# Patient Record
Sex: MALE | Race: OTHER | Employment: FULL TIME | URBAN - METROPOLITAN AREA
[De-identification: names, ages, dates, MRNs, and addresses within clinical notes are randomized per-mention and may not be internally consistent; named-entity substitution may affect disease eponyms.]

---

## 2021-05-13 ENCOUNTER — APPOINTMENT (OUTPATIENT)
Dept: GENERAL RADIOLOGY | Age: 36
End: 2021-05-13

## 2021-05-13 ENCOUNTER — HOSPITAL ENCOUNTER (EMERGENCY)
Age: 36
Discharge: HOME | End: 2021-05-13

## 2021-05-13 VITALS
HEIGHT: 69 IN | BODY MASS INDEX: 38.51 KG/M2 | DIASTOLIC BLOOD PRESSURE: 80 MMHG | HEART RATE: 66 BPM | RESPIRATION RATE: 18 BRPM | TEMPERATURE: 98.3 F | WEIGHT: 260 LBS | OXYGEN SATURATION: 98 % | SYSTOLIC BLOOD PRESSURE: 138 MMHG

## 2021-05-13 DIAGNOSIS — M25.562 ACUTE PAIN OF LEFT KNEE: Primary | ICD-10-CM

## 2021-05-13 PROCEDURE — 99283 EMERGENCY DEPT VISIT LOW MDM: CPT

## 2021-05-13 PROCEDURE — 73562 X-RAY EXAM OF KNEE 3: CPT

## 2021-05-13 PROCEDURE — 6370000000 HC RX 637 (ALT 250 FOR IP): Performed by: PHYSICIAN ASSISTANT

## 2021-05-13 RX ORDER — HYDROCODONE BITARTRATE AND ACETAMINOPHEN 5; 325 MG/1; MG/1
1 TABLET ORAL ONCE
Status: COMPLETED | OUTPATIENT
Start: 2021-05-13 | End: 2021-05-13

## 2021-05-13 RX ORDER — NAPROXEN 500 MG/1
500 TABLET ORAL 2 TIMES DAILY WITH MEALS
Qty: 30 TABLET | Refills: 0 | Status: SHIPPED | OUTPATIENT
Start: 2021-05-13 | End: 2021-08-06 | Stop reason: ALTCHOICE

## 2021-05-13 RX ADMIN — HYDROCODONE BITARTRATE AND ACETAMINOPHEN 1 TABLET: 5; 325 TABLET ORAL at 20:29

## 2021-05-13 ASSESSMENT — PAIN DESCRIPTION - ORIENTATION: ORIENTATION: LEFT

## 2021-05-13 ASSESSMENT — ENCOUNTER SYMPTOMS
NAUSEA: 0
COLOR CHANGE: 0
BACK PAIN: 0

## 2021-05-13 ASSESSMENT — PAIN SCALES - GENERAL: PAINLEVEL_OUTOF10: 10

## 2021-05-13 NOTE — ED NOTES
Bed: A12-12  Expected date:   Expected time:   Means of arrival:   Comments:  8000 Saima Wilkerson, RN  05/13/21 1944

## 2021-05-14 NOTE — ED PROVIDER NOTES
%  Physical Exam  Vitals signs and nursing note reviewed. Constitutional:       General: He is not in acute distress. Appearance: He is well-developed. He is not diaphoretic. HENT:      Head: Normocephalic and atraumatic. Eyes:      Conjunctiva/sclera: Conjunctivae normal.   Neck:      Musculoskeletal: Normal range of motion. Cardiovascular:      Rate and Rhythm: Normal rate and regular rhythm. Heart sounds: Normal heart sounds. Pulmonary:      Effort: Pulmonary effort is normal. No respiratory distress. Breath sounds: Normal breath sounds. Abdominal:      Palpations: Abdomen is soft. Tenderness: There is no abdominal tenderness. Musculoskeletal:      Left knee: He exhibits decreased range of motion, swelling, effusion ( medial) and bony tenderness. He exhibits normal patellar mobility. Tenderness found. Medial joint line tenderness noted. Left ankle: He exhibits normal pulse. No tenderness. Skin:     General: Skin is warm and dry. Neurological:      Mental Status: He is alert and oriented to person, place, and time. Psychiatric:         Behavior: Behavior normal.         Thought Content: Thought content normal.         Judgment: Judgment normal.         Diagnostic Results       RADIOLOGY:  XR KNEE LEFT (3 VIEWS)   Final Result   Impression:    No acute osseous injury. LABS:   No results found for this visit on 05/13/21. ED BEDSIDE ULTRASOUND:      RECENT VITALS:  BP: 138/80, Temp: 98.3 °F (36.8 °C), Pulse: 66, Resp: 18, SpO2: 98 %     Procedures         ED Course     Nursing Notes, Past Medical Hx,Past Surgical Hx, Social Hx, Allergies, and Family Hx were reviewed.     The patient was given the following medications:  Orders Placed This Encounter   Medications    HYDROcodone-acetaminophen (NORCO) 5-325 MG per tablet 1 tablet    naproxen (NAPROSYN) 500 MG tablet     Sig: Take 1 tablet by mouth 2 times daily (with meals)     Dispense:  30 tablet     Refill: 0       CONSULTS:  None    MEDICAL DECISION MAKING / ASSESSMENT / Joselin Yovanny is a 43 y.o. male [facility] emergency department complaint of left knee pain. States this has been going on for the last couple of months but today became so severe he has difficulty moving his knee. Patient had limited range of motion and had to very slowly extend at the knee and was unable to hold leg off bed. After pain medication patient is able to straighten to just under 180 and I am able to feel contraction of the quadriceps as well as palpate his patellar tendon so I do not feel this is a tendon rupture. There is no erythema or warmth concerning for septic joint or overlying cellulitis. He is neurovascular intact distally. X-ray was obtained and no acute osseous abnormality with diffuse soft tissue swelling. Patient was offered a knee immobilizer but he states that it is painful to have his knee fully extended. He already has crutches. An Ace wrap was placed on his knee. He will was given contact information for orthopedics and will call tomorrow to schedule follow-up appointment. I do not have concern for compartment syndrome, DVT, or infectious process in the lower leg. Pain is localized to his anterior knee. Differentials could be ACL or meniscus injury. There is no specific laxity, however difficult to evaluate due to swelling. Patient is in agreement with the plan. He will rest ice elevate and follow-up as discussed with orthopedics. He should take anti-inflammatories as needed for pain. He is stable for discharge. This patient was also evaluated by the attending physician. All care plans were discussed and agreed upon. Clinical Impression     1.  Acute pain of left knee        Disposition     PATIENT REFERRED TO:  DO Latisha Fabianrichard [address] [place] [address] Clement Prasad DO  [address] [facility] [address]  [place] [address] [place] [id number]  [phone]    Call in 1 day        DISCHARGE MEDICATIONS:  New Prescriptions    NAPROXEN (NAPROSYN) 500 MG TABLET    Take 1 tablet by mouth 2 times daily (with meals)       [address] [place], [place]  05/13/21 3573

## 2021-06-02 ENCOUNTER — OFFICE VISIT (OUTPATIENT)
Dept: FAMILY MEDICINE CLINIC | Age: 36
End: 2021-06-02

## 2021-06-02 VITALS
WEIGHT: 280.2 LBS | SYSTOLIC BLOOD PRESSURE: 136 MMHG | TEMPERATURE: 97.5 F | OXYGEN SATURATION: 97 % | HEIGHT: 69 IN | BODY MASS INDEX: 41.5 KG/M2 | RESPIRATION RATE: 16 BRPM | HEART RATE: 87 BPM | DIASTOLIC BLOOD PRESSURE: 87 MMHG

## 2021-06-02 DIAGNOSIS — S83.242A ACUTE MEDIAL MENISCUS TEAR OF LEFT KNEE, INITIAL ENCOUNTER: Primary | ICD-10-CM

## 2021-06-02 PROCEDURE — 99212 OFFICE O/P EST SF 10 MIN: CPT | Performed by: FAMILY MEDICINE

## 2021-06-02 SDOH — ECONOMIC STABILITY: FOOD INSECURITY: WITHIN THE PAST 12 MONTHS, YOU WORRIED THAT YOUR FOOD WOULD RUN OUT BEFORE YOU GOT MONEY TO BUY MORE.: NEVER TRUE

## 2021-06-02 SDOH — ECONOMIC STABILITY: TRANSPORTATION INSECURITY
IN THE PAST 12 MONTHS, HAS LACK OF TRANSPORTATION KEPT YOU FROM MEETINGS, WORK, OR FROM GETTING THINGS NEEDED FOR DAILY LIVING?: NO

## 2021-06-02 SDOH — ECONOMIC STABILITY: FOOD INSECURITY: WITHIN THE PAST 12 MONTHS, THE FOOD YOU BOUGHT JUST DIDN'T LAST AND YOU DIDN'T HAVE MONEY TO GET MORE.: NEVER TRUE

## 2021-06-02 SDOH — ECONOMIC STABILITY: TRANSPORTATION INSECURITY
IN THE PAST 12 MONTHS, HAS THE LACK OF TRANSPORTATION KEPT YOU FROM MEDICAL APPOINTMENTS OR FROM GETTING MEDICATIONS?: NO

## 2021-06-02 ASSESSMENT — PATIENT HEALTH QUESTIONNAIRE - PHQ9
SUM OF ALL RESPONSES TO PHQ QUESTIONS 1-9: 0
SUM OF ALL RESPONSES TO PHQ QUESTIONS 1-9: 0
1. LITTLE INTEREST OR PLEASURE IN DOING THINGS: 0
SUM OF ALL RESPONSES TO PHQ9 QUESTIONS 1 & 2: 0
SUM OF ALL RESPONSES TO PHQ QUESTIONS 1-9: 0
2. FEELING DOWN, DEPRESSED OR HOPELESS: 0

## 2021-06-02 ASSESSMENT — SOCIAL DETERMINANTS OF HEALTH (SDOH): HOW HARD IS IT FOR YOU TO PAY FOR THE VERY BASICS LIKE FOOD, HOUSING, MEDICAL CARE, AND HEATING?: SOMEWHAT HARD

## 2021-06-02 NOTE — PROGRESS NOTES
Preoperative Consultation      Alexia Jimenez  YOB: 1985    Date of Service:  6/2/2021    Vitals:    06/02/21 1402   BP: 136/87   Pulse: 87   Resp: 16   Temp: 97.5 °F (36.4 °C)   TempSrc: Tympanic   SpO2: 97%   Weight: 280 lb 3.2 oz (127.1 kg)   Height: 5' 9\" (1.753 m)      Wt Readings from Last 2 Encounters:   06/02/21 280 lb 3.2 oz (127.1 kg)   05/13/21 260 lb (117.9 kg)     BP Readings from Last 3 Encounters:   06/02/21 136/87   05/13/21 138/80   06/27/16 104/78        Chief Complaint   Patient presents with    Pre-op Exam     left knee surgery on 6/7/21 w/ Dr. Man Heard at [place] [address]     No Known Allergies  Outpatient Medications Marked as Taking for the 6/2/21 encounter (Office Visit) with Kristy Little, DO   Medication Sig Dispense Refill    naproxen (NAPROSYN) 500 MG tablet Take 1 tablet by mouth 2 times daily (with meals) 30 tablet 0    butalbital-acetaminophen-caffeine (FIORICET) -40 MG per tablet 1-2 capsules by mouth every 4 hours as needed for headache. Maximum dose- 6 capsules/24 hr. 30 tablet 1       This patient presents to the office today for a preoperative consultation at the request of surgeon, Dr. Man Heard, who plans on performing left knee surgery on June 7 at [facility]. The current problem began >  months ago, and symptoms have been worsening with time. Conservative therapy: n/a. Planned anesthesia: General   Known anesthesia problems: None. Inguinal hernia surgery 13 yo. Bleeding risk: No recent or remote history of abnormal bleeding  Personal or FH of DVT/PE: No    Patient objection to receiving blood products: No    Patient Active Problem List   Diagnosis    Myopia of both eyes    Fatty liver    Does not have health insurance    Frontal sinus pain    Tension headache       No past medical history on file. No past surgical history on file. No family history on file.   Social History     Socioeconomic History    Marital status:  range of motion. Neck supple. No JVD present. Carotid bruit is not present. No mass and no thyromegaly present. Cardiovascular: Normal rate, regular rhythm, normal heart sounds and intact distal pulses. Exam reveals no gallop and no friction rub. No murmur heard. Pulmonary/Chest: Effort normal and breath sounds normal. No respiratory distress. He has no wheezes. He has no rales. Abdominal: Soft. Normal aorta and bowel sounds are normal. He exhibits no distension and no mass. There is no hepatosplenomegaly. No tenderness. Musculoskeletal: He exhibits no edema and no tenderness. Neurological: He is alert and oriented to person, place, and time. He has normal strength. No cranial nerve deficit or sensory deficit. Coordination and gait normal.   Skin: Skin is warm and dry. No rash noted. No erythema. Psychiatric: He has a normal mood and affect. His behavior is normal.        Assessment:       43 y.o. patient with planned surgery as above. Known risk factors for perioperative complications: None  Current medications which may produce withdrawal symptoms if withheld perioperatively: none      Plan:     1. Preoperative workup as follows: none  2. Change in medication regimen before surgery: None  3. Prophylaxis for cardiac events with perioperative beta-blockers: Not indicated  ACC/AHA indications for pre-operative beta-blocker use:    · Vascular surgery with history of postitive stress test  · Intermediate or high risk surgery with history of CAD   · Intermediate or high risk surgery with multiple clinical predictors of CAD- 2 of the following: history of compensated or prior heart failure, history of cerebrovascular disease, DM, or renal insufficiency    Routine administration of higher-dose, long-acting metoprolol in beta-blockernaïve patients on the day of surgery, and in the absence of dose titration is associated with an overall increase in mortality.   Beta-blockers should be started days to weeks prior to surgery and titrated to pulse < 70.  4. Deep vein thrombosis prophylaxis: regimen to be chosen by surgical team  5. No contraindications to planned surgery. 6. Copy to be faxed. Copy to be given to patient. Self Pay. In between insurance plan. Will help by down coding.

## 2021-06-23 ENCOUNTER — OFFICE VISIT (OUTPATIENT)
Dept: FAMILY MEDICINE CLINIC | Age: 36
End: 2021-06-23

## 2021-06-23 VITALS
OXYGEN SATURATION: 97 % | HEART RATE: 85 BPM | TEMPERATURE: 97.5 F | RESPIRATION RATE: 21 BRPM | WEIGHT: 278.8 LBS | BODY MASS INDEX: 41.17 KG/M2 | SYSTOLIC BLOOD PRESSURE: 122 MMHG | DIASTOLIC BLOOD PRESSURE: 98 MMHG

## 2021-06-23 DIAGNOSIS — R35.0 URINARY FREQUENCY: Primary | ICD-10-CM

## 2021-06-23 DIAGNOSIS — Z78.9 UNCIRCUMCISED MALE: ICD-10-CM

## 2021-06-23 DIAGNOSIS — N48.1 BALANITIS: ICD-10-CM

## 2021-06-23 LAB
BILIRUBIN, POC: NORMAL
BLOOD URINE, POC: NORMAL
CLARITY, POC: CLEAR
COLOR, POC: YELLOW
GLUCOSE URINE, POC: NORMAL
KETONES, POC: NORMAL
LEUKOCYTE EST, POC: NORMAL
NITRITE, POC: NORMAL
PH, POC: 6
PROTEIN, POC: NORMAL
SPECIFIC GRAVITY, POC: 1.03
UROBILINOGEN, POC: 0.2

## 2021-06-23 PROCEDURE — 99214 OFFICE O/P EST MOD 30 MIN: CPT | Performed by: FAMILY MEDICINE

## 2021-06-23 PROCEDURE — 81002 URINALYSIS NONAUTO W/O SCOPE: CPT | Performed by: FAMILY MEDICINE

## 2021-06-23 RX ORDER — IBUPROFEN 200 MG
200 TABLET ORAL EVERY 6 HOURS PRN
COMMUNITY
End: 2021-08-06 | Stop reason: ALTCHOICE

## 2021-06-23 RX ORDER — CLOTRIMAZOLE AND BETAMETHASONE DIPROPIONATE 10; .64 MG/G; MG/G
CREAM TOPICAL
Qty: 45 G | Refills: 0 | Status: SHIPPED | OUTPATIENT
Start: 2021-06-23 | End: 2021-08-06 | Stop reason: ALTCHOICE

## 2021-06-23 NOTE — PATIENT INSTRUCTIONS
Patient Education        Balanitis: Care Instructions  Your Care Instructions     Balanitis is irritation of the head of the penis. It is more common in men who have not been circumcised. The area under the foreskin that covers the head of the penis is often warm and moist. This can cause the growth of bacteria or a fungus. This can make the penis sore, red, swollen, and itchy. You may also feel burning when you urinate, have pus come from your penis, or have chills and a fever. Balanitis can also be caused by the chemicals in soap, condoms, or lubricants. Men with diabetes are more likely to get balanitis. Your doctor may suggest a cream that usually clears up the problem within 2 weeks. You can prevent balanitis by keeping your penis clean. You also can help prevent it by not using products that cause irritation. Follow-up care is a key part of your treatment and safety. Be sure to make and go to all appointments, and call your doctor if you are having problems. It's also a good idea to know your test results and keep a list of the medicines you take. How can you care for yourself at home? · Be safe with medicines. Take your medicine exactly as prescribed. If your doctor prescribed antibiotics, take them as directed. Do not stop taking them just because you feel better. You need to take the full course of antibiotics. · Keep your penis clean. If you have not been circumcised, gently pull the foreskin back to wash your penis with warm water. Make sure your penis is dry before you get dressed. · If latex condoms irritate your penis, try other condoms that are made for sensitive skin. Your doctor can help you make a good choice. · Wash your underwear with mild soap. Rinse it well. · If you work with harsh chemicals, wash your hands well before you go to the bathroom. When should you call for help? Call your doctor now or seek immediate medical care if:    · You have signs of infection, such as:  ? Increased pain, swelling, warmth, or redness. ? Red streaks leading from the area. ? Pus draining from the area. ? A fever. Watch closely for changes in your health, and be sure to contact your doctor if:    · You do not get better as expected. Where can you learn more? Go to [email or web address] and sign in to your [employer] account. Enter S742 in the PingMD box to learn more about \"Balanitis: Care Instructions. \"     If you do not have an account, please click on the \"Sign Up Now\" link. Current as of: February 10, 2021               Content Version: 12.9  © 2006-2021 [employer]. Care instructions adapted under license by [facility] If you have questions about a medical condition or this instruction, always ask your healthcare professional. [address] any warranty or liability for your use of this information.

## 2021-06-23 NOTE — PROGRESS NOTES
Patient is here for urinary frequency X 2 weeks. He had knee surgery 2 weeks ago and frequency started then. No dysuria or hematuria. No penile discharge . Skin irritation to penile area with uncircumcised. Review of Systems    ROS: All other systems were reviewed and are negative . Patient's allergies and medications were reviewed. Patient's past medical, surgical, social , and family history were reviewed. Results for POC orders placed in visit on 06/23/21   POCT Urinalysis no Micro   Result Value Ref Range    Color, UA YELLOW     Clarity, UA CLEAR     Glucose, UA POC NEG     Bilirubin, UA NEG     Ketones, UA NEG     Spec Grav, UA 1.030     Blood, UA POC TRACE     pH, UA 6.0     Protein, UA POC NEG     Urobilinogen, UA 0.2     Leukocytes, UA NEG     Nitrite, UA NEG       OBJECTIVE:  BP (!) 122/98   Pulse 85   Temp 97.5 °F (36.4 °C)   Resp 21   Wt 278 lb 12.8 oz (126.5 kg)   SpO2 97%   BMI 41.17 kg/m²     Physical Exam    General: NAD, cooperative, alert and oriented X 3. Mood / affect is good. good insight. well hydrated. Neck : no lymphadenopathy, supple, FROM  CV: Regular rate and rhythm , no murmurs/ rub/ gallop. No edema. Lungs : CTA bilaterally, breathing comfortably  Abdomen: positive bowel sounds, soft , non tender, non distended. No hepatosplenomegaly. No CVA tenderness. : Genitals normal; both testes normal without tenderness, masses, hydroceles, varicoceles. Distal shaft of penis with erythema and small fissuring. No significant swelling. Shaft uncircumcised, meatus normal without discharge. No inguinal hernia noted. No inguinal lymphadenopathy. Skin: no rashes. Non tender. ASSESSMENT/  PLAN:  1. Urinary frequency  - Minimize caffeine and alcohol.   - POCT Urinalysis no Micro    2. Balanitis  - Clotrimazole-betamethasone (LOTRISONE) 1-0.05 % cream; Apply topically 2 times daily X 1-2 weeks. Dispense: 45 g; Refill: 0  - Keep area clean and dry.    - Sanjiv English [facility], [facility]    3. Uncircumcised male  - Referral per patient request to Urologist for circumcision.  - Referral - Chad Vasques MD, Urology, [facility]     Follow up if no improvement in 2 weeks/ as needed for increased symptoms.

## 2021-08-06 ENCOUNTER — HOSPITAL ENCOUNTER (OUTPATIENT)
Age: 36
Discharge: HOME | End: 2021-08-06

## 2021-08-06 ENCOUNTER — OFFICE VISIT (OUTPATIENT)
Dept: FAMILY MEDICINE CLINIC | Age: 36
End: 2021-08-06

## 2021-08-06 ENCOUNTER — HOSPITAL ENCOUNTER (OUTPATIENT)
Dept: GENERAL RADIOLOGY | Age: 36
Discharge: HOME | End: 2021-08-06

## 2021-08-06 VITALS
RESPIRATION RATE: 18 BRPM | BODY MASS INDEX: 40.73 KG/M2 | SYSTOLIC BLOOD PRESSURE: 134 MMHG | WEIGHT: 275.8 LBS | TEMPERATURE: 97.3 F | DIASTOLIC BLOOD PRESSURE: 85 MMHG | OXYGEN SATURATION: 97 % | HEART RATE: 80 BPM

## 2021-08-06 DIAGNOSIS — R05.9 COUGH: ICD-10-CM

## 2021-08-06 DIAGNOSIS — R53.83 OTHER FATIGUE: Primary | ICD-10-CM

## 2021-08-06 DIAGNOSIS — J40 BRONCHITIS: ICD-10-CM

## 2021-08-06 PROCEDURE — 71046 X-RAY EXAM CHEST 2 VIEWS: CPT

## 2021-08-06 PROCEDURE — 99213 OFFICE O/P EST LOW 20 MIN: CPT | Performed by: FAMILY MEDICINE

## 2021-08-06 RX ORDER — AZITHROMYCIN 250 MG/1
250 TABLET, FILM COATED ORAL SEE ADMIN INSTRUCTIONS
Qty: 6 TABLET | Refills: 0 | Status: SHIPPED | OUTPATIENT
Start: 2021-08-06 | End: 2021-08-11

## 2021-08-06 RX ORDER — BENZONATATE 200 MG/1
200 CAPSULE ORAL 3 TIMES DAILY PRN
Qty: 30 CAPSULE | Refills: 0 | Status: SHIPPED | OUTPATIENT
Start: 2021-08-06 | End: 2021-08-13

## 2021-08-06 RX ORDER — FLUCONAZOLE 150 MG/1
150 TABLET ORAL
Qty: 2 TABLET | Refills: 0 | Status: SHIPPED | OUTPATIENT
Start: 2021-08-06 | End: 2021-08-12

## 2021-08-06 NOTE — PROGRESS NOTES
[facility]  Progress Note  Jo-Ann Huynh DO          Anastasiia Perez  1985 08/06/21    Chief Complaint:   Anastasiia Perez is a 43 y.o. male who is here for cough and fatigue        HPI:   Return forms in [address] [place] [address] by his wife. Has had chronic cough now on and off for about a year. Currently works detailing cars. Has had increased fatigue in the last 3 weeks which is unusual for him. His job typically requires a lot of physical exertion. He has not had to cancel work. Has some skin irritation of the penis. Seen by my colleague in June. Referred to urologist which she has not seen at this point. States that the skin situation in his private area is resolved and wanted prescription medicine on hand if this reoccurs again. He declines  evaluation. He has tried his wife's inhaler and this provides a small amount of relief. ROS negative for headache, visionchanges, chest pain, shortness of breath, abdominal pain, urinary sx, bowel changes. Past medical, surgical, and social history reviewed. and allergies reviewed. No Known Allergies  Prior to Visit Medications    Medication Sig Taking?  Authorizing Provider   fluconazole (DIFLUCAN) 150 MG tablet Take 1 tablet by mouth every 72 hours for 6 days Yes Solo Yan,    azithromycin (ZITHROMAX) 250 MG tablet Take 1 tablet by mouth See Admin Instructions for 5 days 500mg on day 1 followed by 250mg on days 2 - 5 Yes Haydee Little DO   benzonatate (TESSALON) 200 MG capsule Take 1 capsule by mouth 3 times daily as needed for Cough Yes Haydee Little DO          Vitals:    08/06/21 0800   BP: 134/85   Pulse: 80   Resp: 18   Temp: 97.3 °F (36.3 °C)   TempSrc: Temporal   SpO2: 97%   Weight: 275 lb 12.8 oz (125.1 kg)      Wt Readings from Last 3 Encounters:   08/06/21 275 lb 12.8 oz (125.1 kg)   06/23/21 278 lb 12.8 oz (126.5 kg)   06/02/21 280 lb 3.2 oz (127.1 kg)     BP Readings from Last 3 Encounters:   08/06/21 134/85   06/23/21 (!) 122/98   06/02/21 136/87       Patient Active Problem List   Diagnosis    Myopia of both eyes    Fatty liver    Does not have health insurance    Frontal sinus pain    Tension headache       Immunization History   Administered Date(s) Administered    COVID-19, Pfizer, PF, 30mcg/0.3mL 03/02/2021, 04/12/2021       No past medical history on file. No past surgical history on file. No family history on file. Social History     Socioeconomic History    Marital status:      Spouse name: Not on file    Number of children: Not on file    Years of education: Not on file    Highest education level: Not on file   Occupational History    Not on file   Tobacco Use    Smoking status: Former Smoker    Smokeless tobacco: Never Used   Substance and Sexual Activity    Alcohol use: No     Alcohol/week: 0.0 standard drinks    Drug use: No    Sexual activity: Yes     Partners: Female   Other Topics Concern    Not on file   Social History Narrative    Not on file     Social Determinants of Health     Financial Resource Strain: Medium Risk    Difficulty of Paying Living Expenses: Somewhat hard   Food Insecurity: No Food Insecurity    Worried About Running Out of Food in the Last Year: Never true    Daniela of Food in the Last Year: Never true   Transportation Needs: No Transportation Needs    Lack of Transportation (Medical): No    Lack of Transportation (Non-Medical):  No   Physical Activity:     Days of Exercise per Week:     Minutes of Exercise per Session:    Stress:     Feeling of Stress :    Social Connections:     Frequency of Communication with Friends and Family:     Frequency of Social Gatherings with Friends and Family:     Attends [employer] Services:     Active Member of Clubs or Organizations:     Attends Club or Organization Meetings:     Marital Status:    Intimate Partner Violence:     Fear of Current or Ex-Partner:     Emotionally Abused:     Physically Abused:     Sexually Abused:        O: /85   Pulse 80   Temp 97.3 °F (36.3 °C) (Temporal)   Resp 18   Wt 275 lb 12.8 oz (125.1 kg)   SpO2 97%   BMI 40.73 kg/m²   Physical Exam  GEN: No acute distress,cooperative, well nourished, alert. HEENT: PEERLA, EOMI , normocephalic/atraumatic, external nose appears normal.  External ear is normal.    Neck: soft, supple, no appreciable thyromegaly,mass  CV: No upper extremity edema. Resp:  Breathing comfortably. Psych:normal affect. Neuro: AOx3  Other Pertinent Physical Exam findings: Heart: Normal S1 and S2 with regular rhythm. Lungs: Clear to auscultation bilaterally. Abd: No tenderness to palpation. ASSESSMENT   Diagnosis Orders   1. Other fatigue  CBC WITH AUTO DIFFERENTIAL    COMPREHENSIVE METABOLIC PANEL    TSH with Reflex   2. Cough  XR CHEST STANDARD (2 VW)   3. Bronchitis  azithromycin (ZITHROMAX) 250 MG tablet    benzonatate (TESSALON) 200 MG capsule       With a history of working in [facility] and his line of work if the chest x-ray and blood work is negative for other findings include non-Hodgkin's lymphoma will consider CT scan. PLAN          Time spent on encounter (including any number of the following: review of labs, imaging, provider notes, outside hospital records; performing examination/evaluation; counseling patient and family; ordering medications/tests; placing referrals and communication with referring physicians; coordination of care, and documentation in the EHR): 20 minutes  Established E/M: 10-19 (03975), 20-29 (77503), 30-39 (75947), 40-54 (43518)   New E/M: 15-29 (70693), 30-44 (09960), 45-59 (49590), 60-74 (05668)  Telephone E/M: 5-10 ([email or web address]), 11-20 (13184), 21-30 (30247)    If applicable, see additional patient information and instructions under \"Patient Instructions. \"    No follow-ups on file. There are no Patient Instructions on file for this visit.       Please note a portion of this chart

## 2021-08-17 ENCOUNTER — OFFICE VISIT (OUTPATIENT)
Dept: FAMILY MEDICINE CLINIC | Age: 36
End: 2021-08-17

## 2021-08-17 VITALS
SYSTOLIC BLOOD PRESSURE: 130 MMHG | HEART RATE: 77 BPM | BODY MASS INDEX: 40.99 KG/M2 | DIASTOLIC BLOOD PRESSURE: 86 MMHG | RESPIRATION RATE: 16 BRPM | WEIGHT: 277.6 LBS | OXYGEN SATURATION: 96 % | TEMPERATURE: 97.1 F

## 2021-08-17 DIAGNOSIS — R05.3 CHRONIC COUGH: ICD-10-CM

## 2021-08-17 DIAGNOSIS — R74.8 ELEVATED LIVER ENZYMES: ICD-10-CM

## 2021-08-17 DIAGNOSIS — R73.09 ELEVATED GLUCOSE: Primary | ICD-10-CM

## 2021-08-17 PROCEDURE — 99213 OFFICE O/P EST LOW 20 MIN: CPT | Performed by: FAMILY MEDICINE

## 2021-08-17 RX ORDER — GUAIFENESIN AND CODEINE PHOSPHATE 100; 10 MG/5ML; MG/5ML
5 SOLUTION ORAL 2 TIMES DAILY PRN
Qty: 60 ML | Refills: 0 | Status: SHIPPED | OUTPATIENT
Start: 2021-08-17 | End: 2021-08-23

## 2021-08-17 NOTE — PATIENT INSTRUCTIONS
PLEASE CUT BACK ON CARB INTAKE BY 1/3RD TO 1/2 OF CURRENT INTAKE. PLAN TO RECHECK A1C AND CMP AS EARLY AS October AND NO LATER THAN END OF FEB 2022.

## 2021-08-17 NOTE — PROGRESS NOTES
[facility]  Progress Note  Camilo Neal DO Dayana Stuart  1985 08/17/21    Chief Complaint:   Dayana Stuart is a 43 y.o. male who is here for follow-up on lab work for elevated glucose and liver enzymes chronic cough        HPI:   Accompanied by wife today. Continues to experience chronic cough. Not significantly improved by azithromycin last month. Tessalon does not seem to be effective during the daytime. We spent more than 10 minutes on chart review of his  health records about CT scans and a biopsy which proved negative for lung nodule. There was seems to be mention of some type of fungal infection. Additionally admits that he can cut back on the carbs. With his food choices he does eat quite a bit of tortillas. ROS negative for headache, visionchanges, chest pain, shortness of breath, abdominal pain, urinary sx, bowel changes. Past medical, surgical, and social history reviewed. and allergies reviewed. No Known Allergies  Prior to Visit Medications    Medication Sig Taking? Authorizing Provider   guaiFENesin-codeine (TUSSI-ORGANIDIN NR) 100-10 MG/5ML syrup Take 5 mLs by mouth 2 times daily as needed for Cough for up to 6 days.  Yes Silverio Little DO          Vitals:    08/17/21 0941   BP: 130/86   Pulse: 77   Resp: 16   Temp: 97.1 °F (36.2 °C)   TempSrc: Tympanic   SpO2: 96%   Weight: 277 lb 9.6 oz (125.9 kg)      Wt Readings from Last 3 Encounters:   08/17/21 277 lb 9.6 oz (125.9 kg)   08/06/21 275 lb 12.8 oz (125.1 kg)   06/23/21 278 lb 12.8 oz (126.5 kg)     BP Readings from Last 3 Encounters:   08/17/21 130/86   08/06/21 134/85   06/23/21 (!) 122/98       Patient Active Problem List   Diagnosis    Myopia of both eyes    Fatty liver    Does not have health insurance    Frontal sinus pain    Tension headache       Immunization History   Administered Date(s) Administered    COVID-19, Pfizer, PF, 30mcg/0.3mL 03/02/2021, 03/22/2021, Neutrophils Absolute Latest Ref Range: 1.7 - 7.7 K/uL 4.2   Lymphocytes Absolute Latest Ref Range: 1.0 - 5.1 K/uL 3.0   Monocytes Absolute Latest Ref Range: 0.0 - 1.3 K/uL 0.9   Eosinophils Absolute Latest Ref Range: 0.0 - 0.6 K/uL 0.6   Basophils Absolute Latest Ref Range: 0.0 - 0.2 K/uL 0.1         ASSESSMENT   Diagnosis Orders   1. Elevated glucose  Hemoglobin A1C   2. Elevated liver enzymes  COMPREHENSIVE METABOLIC PANEL   3. Chronic cough  CT CHEST WO CONTRAST    guaiFENesin-codeine (TUSSI-ORGANIDIN NR) 100-10 MG/5ML syrup     #1:  Counseled on cutting back on carbs. Glucose of 169 suggestive of prediabetes or early diabetes. Make attempts to cut back on carbs and recheck blood work. Has a diagnosis of fatty liver regarding #2. Does not consume alcohol on a regular basis. If continues to be elevated will likely refer to gastroenterology. #3: Differential diagnosis of fungal infections with history living in [facility].    PLAN          Time spent on encounter (including any number of the following: review of labs, imaging, provider notes, outside hospital records; performing examination/evaluation; counseling patient and family; ordering medications/tests; placing referrals and communication with referring physicians; coordination of care, and documentation in the EHR): 28 minutes  Established E/M: 10-19 (35005), 20-29 (26743), 30-39 (38650), 40-54 (73474)   New E/M: 15-29 (60697), 30-44 (47560), 45-59 (94845), 60-74 (91065)  Telephone E/M: 5-10 (Kali), 11-20 (24717), 21-30 (39710)    If applicable, see additional patient information and instructions under \"Patient Instructions. \"    No follow-ups on file. Patient Instructions   PLEASE CUT BACK ON CARB INTAKE BY 1/3RD TO 1/2 OF CURRENT INTAKE. PLAN TO RECHECK A1C AND CMP AS EARLY AS October AND NO LATER THAN END OF FEB 2022. Please note a portion of this chart was generated using dragon dictation software.  Although every effort was made to ensure the accuracy of this automated transcription,some errors in transcription may have occurred.

## 2023-07-28 ENCOUNTER — OFFICE VISIT (OUTPATIENT)
Dept: FAMILY MEDICINE CLINIC | Age: 38
End: 2023-07-28

## 2023-07-28 VITALS
WEIGHT: 269.2 LBS | HEART RATE: 86 BPM | OXYGEN SATURATION: 97 % | BODY MASS INDEX: 39.75 KG/M2 | TEMPERATURE: 97.3 F | SYSTOLIC BLOOD PRESSURE: 106 MMHG | DIASTOLIC BLOOD PRESSURE: 72 MMHG

## 2023-07-28 DIAGNOSIS — E11.9 TYPE 2 DIABETES MELLITUS WITHOUT COMPLICATION, WITHOUT LONG-TERM CURRENT USE OF INSULIN (HCC): Primary | ICD-10-CM

## 2023-07-28 DIAGNOSIS — R73.09 ELEVATED GLUCOSE: ICD-10-CM

## 2023-07-28 DIAGNOSIS — E66.09 CLASS 2 OBESITY DUE TO EXCESS CALORIES WITHOUT SERIOUS COMORBIDITY WITH BODY MASS INDEX (BMI) OF 39.0 TO 39.9 IN ADULT: ICD-10-CM

## 2023-07-28 DIAGNOSIS — K76.0 FATTY LIVER: ICD-10-CM

## 2023-07-28 LAB — HBA1C MFR BLD: 11.2 %

## 2023-07-28 PROCEDURE — 3046F HEMOGLOBIN A1C LEVEL >9.0%: CPT | Performed by: FAMILY MEDICINE

## 2023-07-28 PROCEDURE — 99214 OFFICE O/P EST MOD 30 MIN: CPT | Performed by: FAMILY MEDICINE

## 2023-07-28 PROCEDURE — 83037 HB GLYCOSYLATED A1C HOME DEV: CPT | Performed by: FAMILY MEDICINE

## 2023-07-28 SDOH — ECONOMIC STABILITY: FOOD INSECURITY: WITHIN THE PAST 12 MONTHS, THE FOOD YOU BOUGHT JUST DIDN'T LAST AND YOU DIDN'T HAVE MONEY TO GET MORE.: NEVER TRUE

## 2023-07-28 SDOH — ECONOMIC STABILITY: HOUSING INSECURITY
IN THE LAST 12 MONTHS, WAS THERE A TIME WHEN YOU DID NOT HAVE A STEADY PLACE TO SLEEP OR SLEPT IN A SHELTER (INCLUDING NOW)?: NO

## 2023-07-28 SDOH — ECONOMIC STABILITY: INCOME INSECURITY: HOW HARD IS IT FOR YOU TO PAY FOR THE VERY BASICS LIKE FOOD, HOUSING, MEDICAL CARE, AND HEATING?: NOT HARD AT ALL

## 2023-07-28 SDOH — ECONOMIC STABILITY: FOOD INSECURITY: WITHIN THE PAST 12 MONTHS, YOU WORRIED THAT YOUR FOOD WOULD RUN OUT BEFORE YOU GOT MONEY TO BUY MORE.: NEVER TRUE

## 2023-07-28 ASSESSMENT — PATIENT HEALTH QUESTIONNAIRE - PHQ9
1. LITTLE INTEREST OR PLEASURE IN DOING THINGS: 0
SUM OF ALL RESPONSES TO PHQ9 QUESTIONS 1 & 2: 0
SUM OF ALL RESPONSES TO PHQ QUESTIONS 1-9: 0
2. FEELING DOWN, DEPRESSED OR HOPELESS: 0
SUM OF ALL RESPONSES TO PHQ QUESTIONS 1-9: 0

## 2023-07-28 NOTE — PROGRESS NOTES
Subjective    Trinity Weaver is a 34 y.o. Male who came into the clinic today to establish care with me and for appropriate   management. Since I am seeing the patient for the first time today I did review in detail his medical history. The patient informs me that he is not on any medication for any chronic medical problems. The patient today   informs me that he feels a lump in his epigastric region but denies any pain or discomfort in the area. As per   the patient he has started noticing more in past 6 to 8 weeks. After examination, I informed the patient that   the lump he is feeling there is his xiphoid process. While reviewing his chart and noticed that the patient fasting   blood sugar was found to be elevated at 169 when it was checked in August 2021. The patient complains of    polyuria, polydipsia. A point-of-care HbA1c was done in the clinic today and it was 11.2. I informed the patient   that he is diabetic and the patient would like to be started on a medication for the same. I also highly recommended   the patient to try to lose weight with diet and exercise. Provided him with information and recommendations for   dietary and lifestyle changes especially in concerns to his uncontrolled diabetes. The patient was also found to   have very elevated liver enzymes when checked in August 2021. The patient does not have any insurance at   this time and would wait for some time to get it rechecked. As per the patient he was informed of having fatty liver   in the past.  No other questions or concerns today and all the question and concerns were appropriately answered. History reviewed. No pertinent past medical history. Patient Active Problem List   Diagnosis    Fatty liver    Tension headache    Class 2 obesity due to excess calories without serious comorbidity with body mass index (BMI) of 39.0 to 39.9 in adult       History reviewed. No pertinent surgical history.     History

## 2023-10-30 ENCOUNTER — OFFICE VISIT (OUTPATIENT)
Dept: FAMILY MEDICINE CLINIC | Age: 38
End: 2023-10-30

## 2023-10-30 VITALS
HEART RATE: 67 BPM | BODY MASS INDEX: 41.09 KG/M2 | OXYGEN SATURATION: 96 % | SYSTOLIC BLOOD PRESSURE: 122 MMHG | WEIGHT: 277.4 LBS | TEMPERATURE: 97.3 F | HEIGHT: 69 IN | DIASTOLIC BLOOD PRESSURE: 82 MMHG

## 2023-10-30 DIAGNOSIS — E11.65 UNCONTROLLED TYPE 2 DIABETES MELLITUS WITH HYPERGLYCEMIA (HCC): Primary | ICD-10-CM

## 2023-10-30 DIAGNOSIS — Z23 NEEDS FLU SHOT: ICD-10-CM

## 2023-10-30 LAB — HBA1C MFR BLD: 8.3 %

## 2023-10-30 PROCEDURE — 83036 HEMOGLOBIN GLYCOSYLATED A1C: CPT | Performed by: FAMILY MEDICINE

## 2023-10-30 PROCEDURE — 3052F HG A1C>EQUAL 8.0%<EQUAL 9.0%: CPT | Performed by: FAMILY MEDICINE

## 2023-10-30 PROCEDURE — 99213 OFFICE O/P EST LOW 20 MIN: CPT | Performed by: FAMILY MEDICINE

## 2023-10-30 PROCEDURE — 90471 IMMUNIZATION ADMIN: CPT | Performed by: FAMILY MEDICINE

## 2023-10-30 PROCEDURE — 90674 CCIIV4 VAC NO PRSV 0.5 ML IM: CPT | Performed by: FAMILY MEDICINE

## 2023-10-30 NOTE — PATIENT INSTRUCTIONS
can teach you how to keep track of the amount of carbs you eat. This is called carbohydrate counting. If you are not sure how to count carbohydrate grams, use the plate method to plan meals. It is a quick way to make sure that you have a balanced meal. It also can help you manage the amount of carbohydrate you eat at meals. Divide your plate by types of foods. Put non-starchy vegetables on half the plate, protein foods on a fourth of the plate, and carbohydrate foods on the final fourth of the plate. Try to eat about the same amount of carbs at each meal. Do not \"save up\" your daily allowance of carbs to eat at one meal.  Proteins have very little or no carbs. Examples of proteins are beef, chicken, turkey, fish, eggs, tofu, cheese, cottage cheese, and peanut butter. How can you eat out and still eat healthy? Learn to estimate the serving sizes of foods that have carbohydrate. If you measure food at home, it will be easier to estimate the amount in a serving of restaurant food. If the meal you order has too much carbohydrate (such as potatoes, corn, or baked beans), ask to have a low-carbohydrate food instead. Ask for a salad or non-starchy vegetables like broccoli, cauliflower, green beans, or peppers. If you eat more carbohydrate at a meal than you had planned, take a walk or do other exercise. This will help lower your blood sugar. What are some tips for eating healthy? Limit saturated fat, such as the fat from meat and dairy products. This is a healthy choice because people who have diabetes are at higher risk of heart disease. So choose lean cuts of meat and nonfat or low-fat dairy products. Use olive or canola oil instead of butter or shortening when cooking. Don't skip meals. Your blood sugar may drop too low if you skip meals and take insulin or certain medicines for diabetes. Check with your doctor before you drink alcohol. Alcohol can cause your blood sugar to drop too low.  Alcohol can also

## 2023-10-30 NOTE — PROGRESS NOTES
Subjective    Micaela Corea is a 34 y.o. Male who came into the clinic today for recheck and for appropriate management. The patient was last seen by me on 7/28/2023. The patient informs me that he has been taking his medications   as prescribed and has not noticed any side effects from the same. The patient does not check his blood sugar   at home. A point-of-care HbA1c was done in the clinic today and it was 8.3%. I advised the patient to increase   the dose of the metformin to 1000 mg 2 times a day now. The patient verbalized understanding and agreed to   the plan. The patient wants the influenza vaccination today. No other questions or concerns today and all the   question and concerns were appropriately answered. History reviewed. No pertinent past medical history. Patient Active Problem List   Diagnosis    Fatty liver    Tension headache    Class 2 obesity due to excess calories without serious comorbidity with body mass index (BMI) of 39.0 to 39.9 in adult       History reviewed. No pertinent surgical history. History reviewed. No pertinent family history. Social History     Tobacco Use    Smoking status: Former    Smokeless tobacco: Never   Substance Use Topics    Alcohol use: No     Alcohol/week: 0.0 standard drinks of alcohol       No current outpatient medications on file prior to visit. No current facility-administered medications on file prior to visit. No Known Allergies    REVIEW OF SYSTEMS:   CONSTITUTIONAL: No weight loss, fever, chills, weakness or fatigue. HEENT: Eyes: No visual loss, blurred vision, double vision or yellow sclerae. Ears, Nose, Throat: No hearing loss, sneezing, congestion, runny nose or sore throat. SKIN: No rash or itching. CARDIOVASCULAR: No chest pain, chest pressure or chest discomfort. No palpitations or edema. RESPIRATORY: No shortness of breath, cough or sputum.    GASTROINTESTINAL: No anorexia, nausea, vomiting, diarrhea or